# Patient Record
Sex: MALE | Race: WHITE | NOT HISPANIC OR LATINO | URBAN - METROPOLITAN AREA
[De-identification: names, ages, dates, MRNs, and addresses within clinical notes are randomized per-mention and may not be internally consistent; named-entity substitution may affect disease eponyms.]

---

## 2019-03-18 ENCOUNTER — APPOINTMENT (OUTPATIENT)
Dept: GENERAL RADIOLOGY | Facility: HOSPITAL | Age: 29
End: 2019-03-18

## 2019-03-18 ENCOUNTER — HOSPITAL ENCOUNTER (EMERGENCY)
Facility: HOSPITAL | Age: 29
Discharge: HOME | End: 2019-03-18
Attending: EMERGENCY MEDICINE | Admitting: EMERGENCY MEDICINE

## 2019-03-18 VITALS
WEIGHT: 165 LBS | SYSTOLIC BLOOD PRESSURE: 122 MMHG | OXYGEN SATURATION: 99 % | BODY MASS INDEX: 23.1 KG/M2 | HEIGHT: 71 IN | TEMPERATURE: 98.9 F | DIASTOLIC BLOOD PRESSURE: 81 MMHG | RESPIRATION RATE: 16 BRPM | HEART RATE: 84 BPM

## 2019-03-18 DIAGNOSIS — S82.141A CLOSED FRACTURE OF RIGHT TIBIAL PLATEAU, INITIAL ENCOUNTER: Primary | ICD-10-CM

## 2019-03-18 PROCEDURE — 73560 X-RAY EXAM OF KNEE 1 OR 2: CPT

## 2019-03-18 PROCEDURE — 99284 EMERGENCY DEPT VISIT MOD MDM: CPT

## 2019-03-18 RX ORDER — HYDROCODONE BITARTRATE AND ACETAMINOPHEN 5; 325 MG/1; MG/1
1 TABLET ORAL EVERY 6 HOURS PRN
Qty: 18 TABLET | Refills: 0 | Status: SHIPPED | OUTPATIENT
Start: 2019-03-18

## 2019-03-18 RX ORDER — HYDROCODONE BITARTRATE AND ACETAMINOPHEN 7.5; 325 MG/1; MG/1
1 TABLET ORAL ONCE
Status: COMPLETED | OUTPATIENT
Start: 2019-03-18 | End: 2019-03-18

## 2019-03-18 RX ADMIN — HYDROCODONE BITARTRATE AND ACETAMINOPHEN 1 TABLET: 7.5; 325 TABLET ORAL at 10:58

## 2019-03-18 NOTE — DISCHARGE INSTRUCTIONS
Ice and elevate your leg.  Angeline with Dr. Byrne will call you today with a follow up appointment. Please return to the ED if symptoms worsen with increasing pain.

## 2019-03-18 NOTE — ED TRIAGE NOTES
Patient presents from home via private vehicle.  Patient reports a mechanical fall that occurred last night down two steps.  Patient reports left knee injury, difficulty with extension and flexion.  PPP

## 2019-03-18 NOTE — ED PROVIDER NOTES
EMERGENCY DEPARTMENT ENCOUNTER    CHIEF COMPLAINT  Chief Complaint: R knee pain  History given by: Pt  History limited by: Nothing  Room Number: 03/03  PMD: Sukhdeep Chu MD      HPI:  Pt is a 28 y.o. male who presents complaining of R knee pain after falling down two wooden stairs last night. He states he was walking down the stairs last night when he lost his balance causing his to fall down and to the right. Pt reports pain is worse with bearing weight or ROM of his R knee. Pt reports a hx of smoking with 0.5 ppd.     Duration:  Since last night  Onset: sudden  Timing: constant  Location: R knee  Radiation: none stated  Quality: pain after fall  Intensity/Severity: moderate  Progression: unchanged  Associated Symptoms: none  Aggravating Factors: bearing weight, ROM  Alleviating Factors: none  Previous Episodes: None stated  Treatment before arrival: Pt did not state any treatment PTA    PAST MEDICAL HISTORY  Active Ambulatory Problems     Diagnosis Date Noted   • No Active Ambulatory Problems     Resolved Ambulatory Problems     Diagnosis Date Noted   • No Resolved Ambulatory Problems     No Additional Past Medical History       PAST SURGICAL HISTORY  History reviewed. No pertinent surgical history.    FAMILY HISTORY  History reviewed. No pertinent family history.    SOCIAL HISTORY  Social History     Socioeconomic History   • Marital status: Single     Spouse name: Not on file   • Number of children: Not on file   • Years of education: Not on file   • Highest education level: Not on file   Social Needs   • Financial resource strain: Not on file   • Food insecurity - worry: Not on file   • Food insecurity - inability: Not on file   • Transportation needs - medical: Not on file   • Transportation needs - non-medical: Not on file   Occupational History   • Not on file   Tobacco Use   • Smoking status: Current Every Day Smoker   Substance and Sexual Activity   • Alcohol use: No     Frequency: Never   • Drug  use: Not on file   • Sexual activity: Not on file   Other Topics Concern   • Not on file   Social History Narrative   • Not on file       ALLERGIES  Patient has no known allergies.    REVIEW OF SYSTEMS  Review of Systems   Constitutional: Negative for fever.   Respiratory: Negative for shortness of breath.    Cardiovascular: Negative for chest pain.   Musculoskeletal: Positive for arthralgias (R knee).       PHYSICAL EXAM  ED Triage Vitals   Temp Heart Rate Resp BP SpO2   03/18/19 0950 03/18/19 0950 03/18/19 0950 03/18/19 1002 03/18/19 0950   98.9 °F (37.2 °C) 114 14 138/84 100 %      Temp src Heart Rate Source Patient Position BP Location FiO2 (%)   03/18/19 0950 03/18/19 0950 -- -- --   Tympanic Monitor          Physical Exam   Constitutional: He appears distressed (mild).   HENT:   Head: Normocephalic and atraumatic.   Cardiovascular: Regular rhythm.   Pulmonary/Chest: No respiratory distress.   Abdominal: There is no tenderness.   Musculoskeletal:        Right knee: He exhibits decreased range of motion (pain with flexion and extension of the knee), swelling and effusion (suprapatellar). Tenderness (lateral aspect of lower knee) found.        Right ankle: No tenderness.        Cervical back: He exhibits no tenderness.        Thoracic back: He exhibits no tenderness.        Lumbar back: He exhibits no tenderness.        Right upper leg: He exhibits no tenderness.        Right lower leg: He exhibits no tenderness.   Neurological: He has a normal Straight Leg Raise Test.   Skin: No rash noted.   Nursing note and vitals reviewed.    RADIOLOGY  XR Knee 1 or 2 View Right   Preliminary Result   Extra-articular cortical fracture of the lateral aspect of   the lateral tibial plateau [Segond fracture] with mild displacement.   This fracture is a high correlation with ligamentous injury and MRI   would be the best means to further evaluate. There is a large knee joint   effusion.       Discussed with Dr. Dumont in the  emergency department on 03/18/2019 at   11:05 AM.               I ordered the above noted radiological studies. Interpreted by radiologist. Discussed with radiologist (Dr. Horta). Reviewed by me in PACS.       PROCEDURES  Procedures      PROGRESS AND CONSULTS       1033 - Norco ordered for pain. XR R ankle ordered for further evaluation.     1109 - Call placed with ortho. Right knee immobilizer ordered.    1113 - Rechecked pt. Pt is resting comfortably in NAD. Informed pt of the results of his XR which shows a segond fracture, and I am concerned for a possible ACL injury. Stated the plan to discuss his case with ortho.     1145 - Discussed pt care with MIGUEL Marcus with Dr. Mayfield (Ortho) who reports that they will call the pt later today to schedule an appointment.     1209 - CARMEN query (Request # [id number]) complete, which was negative. Treatment plan to include limited course of prescribed controlled substance. Risks including addiction, benefits, and alternatives presented to patient.     1210 - Crutches ordered.     1216 - Rechecked pt. Pt is resting comfortably in NAD. Informed pt of the discussion with Dr. Mayfield's office. Stated the plan to discharge home with pain medication, crutches and a knee immobilizer with ortho and PCP follow up. Pt understands and agrees with plan. All questions answered.     MEDICAL DECISION MAKING  Results were reviewed/discussed with the patient and they were also made aware of online access. Pt also made aware that some labs, such as cultures, will not be resulted during ER visit and follow up with PMD is necessary.     MDM  Number of Diagnoses or Management Options     Amount and/or Complexity of Data Reviewed  Tests in the radiology section of CPT®: ordered and reviewed (XR R knee - Segond fracture)  Discussion of test results with the performing providers: yes (Dr. Horta)  Discuss the patient with other providers: yes (MIGUEL Marcus with Dr. Mayfield (Ortho))            DIAGNOSIS  Final diagnoses:   Closed fracture of right tibial plateau, initial encounter       DISPOSITION  DISCHARGE    Patient discharged in stable condition.    Reviewed implications of results, diagnosis, meds, responsibility to follow up, warning signs and symptoms of possible worsening, potential complications and reasons to return to ER.    Patient/Family voiced understanding of above instructions.    Discussed plan for discharge, as there is no emergent indication for admission. Patient referred to primary care provider for BP management due to today's BP. Pt/family is agreeable and understands need for follow up and repeat testing.  Pt is aware that discharge does not mean that nothing is wrong but it indicates no emergency is present that requires admission and they must continue care with follow-up as given below or physician of their choice.     FOLLOW-UP  Phil Mayfield MD  [address]    [facility] [id number] [phone]    Schedule an appointment as soon as possible for a visit       Sukhdeep Chu MD  [address]  [facility] [id number] [phone]      As needed         Medication List      New Prescriptions    HYDROcodone-acetaminophen 5-325 MG per tablet  Commonly known as:  NORCO  Take 1 tablet by mouth Every 6 (Six) Hours As Needed for Moderate Pain .              Latest Documented Vital Signs:  As of 12:15 PM  BP- 138/84 HR- 114 Temp- 98.9 °F (37.2 °C) (Tympanic) O2 sat- 100%    --  Documentation assistance provided by cristobal Jay for Dr. Dumont.  Information recorded by the scribe was done at my direction and has been verified and validated by me.       Clarke Jay  03/18/19 1226       Javier Dumont MD  03/18/19 1600